# Patient Record
Sex: MALE | Race: WHITE | NOT HISPANIC OR LATINO | URBAN - METROPOLITAN AREA
[De-identification: names, ages, dates, MRNs, and addresses within clinical notes are randomized per-mention and may not be internally consistent; named-entity substitution may affect disease eponyms.]

---

## 2021-04-09 NOTE — IMPRESSION/PLAN
Impression: Primary open-angle glaucoma, severe stage, bilateral Plan: PT HAS POAG OU     GONIO : SS OU (05/08/17)     PACHS: 572 OU (04/11/17) THE LEFT EYE IS THE BETTER SEEING EYE DUE TO BILATERAL SEVERE VF LOSS OU ((WAS OFF TREATMENT 8 YRS PRIOR TO 4/11/17) S/P SLT OD 01/22/2021  SLT OS 02/05/2021 S/P RD OS IN 2012 PT DENIES SULFA ALLERGY
PT DENIES LUNG DZ
TARGET IOP LOW TEENS OR LESS OU
RECOMMEND 1. CONTINUE LATANOPROST OU QPM (STARTED 4/11/17) 2. CONTINUE TIMOLOL 1/2  OU BID 3. CONTINUE BRIMONIDINE OU TID 4. S/P SLT OU AND THE  PT IS DOING WELL 5.  F/U 6 MONTHS

## 2021-04-26 NOTE — IMPRESSION/PLAN
Impression: Exudative macular degeneration, with active choroidal neovascularization, bilateral Plan: Active CNVM OU - minimal SRF OD and persistent retinal fluid OS, continue t7xewqpt Eylea OS, continue w8axzuzf Eylea OD. Eylea 1/3 OU given today without complication. R/B/A discussed at length.  Review in 4 weeks for Eylea 2/3 OS

## 2021-05-24 NOTE — IMPRESSION/PLAN
Impression: Exudative macular degeneration, with active choroidal neovascularization, bilateral Plan: Active CNVM OU - minimal SRF OD and persistent retinal fluid OS, continue c4rhmwim Eylea OS, continue a6zcqnqe Eylea OD. Eylea 2/3 OS given today without complication. R/B/A discussed at length.  Review in 4 weeks for Eylea 3/3 OU

## 2021-06-21 NOTE — IMPRESSION/PLAN
Impression: Exudative macular degeneration, with active choroidal neovascularization, bilateral Plan: Active CNVM OU - minimal SRF OD and persistent retinal fluid OS, continue m7snlrsr Eylea OS, continue a7ecrdug Eylea OD. Eylea 3/3 OU given today without complication. R/B/A discussed at length.  Review in 4 weeks for a dilated exam, likely Eylea just OS

## 2021-07-19 NOTE — IMPRESSION/PLAN
Impression: Exudative macular degeneration, with active choroidal neovascularization, bilateral Plan: Active CNVM OU - minimal SRF OD and persistent retinal fluid OS, continue x2qnvoep Eylea OS, continue a4yvdcsr Eylea OD. Eylea 1/3 OS given today without complication. R/B/A discussed at length.  Review in 4 weeks for Eylea 2/3 OU

## 2021-08-24 NOTE — IMPRESSION/PLAN
Impression: Exudative macular degeneration, with active choroidal neovascularization, bilateral Plan: Active CNVM OU - minimal SRF OD and persistent retinal fluid OS, continue s6xqebrw Eylea OS, continue y3nkbaym Eylea OD. Eylea 2/3 OU given today without complication. R/B/A discussed at length.  Review in 4 weeks for Eylea 3/3 OS

## 2021-09-21 NOTE — IMPRESSION/PLAN
Impression: Exudative macular degeneration, with active choroidal neovascularization, bilateral Plan: Active CNVM OU - minimal SRF OD and persistent retinal fluid OS, continue i4gofvqt Eylea OS, continue w5idurhb Eylea OD. Eylea 3/6 OS given today without complication. R/B/A discussed at length.  Review in 4 weeks for [facility] 4/6 OU

## 2021-10-19 NOTE — IMPRESSION/PLAN
Impression: Exudative macular degeneration, with active choroidal neovascularization, bilateral Plan: Active CNVM OU - minimal SRF OD and persistent retinal fluid OS, continue a2nezfer Eylea OS, continue c5xmglch Eylea OD. Eylea 4/6 OU given today without complication. R/B/A discussed at length.  Review in 4 weeks for Eylea 5/6 OS

## 2021-11-16 NOTE — IMPRESSION/PLAN
Impression: Exudative macular degeneration, with active choroidal neovascularization, bilateral Plan: Active CNVM OU - minimal SRF OD and persistent retinal fluid OS, continue y8oguyaa Eylea OS, continue l0uvjerl Eylea OD. Eylea 5/6 OS given today without complication. R/B/A discussed at length.  Review in 4 weeks for Eylea 6/6 OU

## 2021-12-01 NOTE — IMPRESSION/PLAN
Impression: Primary open-angle glaucoma, severe stage, bilateral

GONIO : SS OU (05/08/17)     PACHS: 572 OU (04/11/17) S/P SLT OD 01/22/2021  SLT OS 02/05/2021 S/P RD OS IN 2012 PT DENIES SULFA ALLERGY
PT DENIES LUNG DZ
TARGET IOP LOW TEENS OR LESS OU Plan: Great IOP OU. Recheck 6 months with RNFL

THE LEFT EYE IS THE BETTER SEEING EYE DUE TO BILATERAL SEVERE VF LOSS OU ((WAS OFF TREATMENT 8 YRS PRIOR TO 4/11/17) RECOMMEND 1. CONTINUE LATANOPROST OU QPM (STARTED 4/11/17) 2. CONTINUE TIMOLOL QAM  OU BID 3. CONTINUE BRIMONIDINE OU TID 4.  S/P SLT OU AND THE  PT IS DOING WELL

## 2021-12-01 NOTE — IMPRESSION/PLAN
Impression: Exudative macular degeneration, with active choroidal neovascularization, bilateral Plan: continue care with Dr. Martin Orourke as scheduled.

## 2021-12-14 NOTE — IMPRESSION/PLAN
Impression: Exudative macular degeneration, with active choroidal neovascularization, bilateral Plan: Active CNVM OU - minimal SRF OD and persistent retinal fluid OS, continue z5vwwtgg Eylea OS, continue f9ixccgt Eylea OD. Eylea 6/6 OU given today without complication. R/B/A discussed at length.  Review in 4 weeks for a dilated exam, possible FA/ICG (to consider extension to 6 weeks)

## 2022-01-11 NOTE — IMPRESSION/PLAN
Impression: Exudative macular degeneration, with active choroidal neovascularization, bilateral Plan: Active CNVM OU - minimal SRF OD and minimal IRF OS - extend to 6 weeks OU Eylea 1/6 OU given today without complication. R/B/A discussed at length.  Review in 6 weeks for Eylea 2/6 OU

## 2022-02-22 NOTE — IMPRESSION/PLAN
Impression: Exudative macular degeneration, with active choroidal neovascularization, bilateral Plan: Active CNVM OU - minimal SRF OD and minimal IRF OS - extend to 6 weeks OU Eylea 2/6 OU given today without complication. R/B/A discussed at length.  Review in 6 weeks for Eylea 3/6 OU

## 2022-04-05 NOTE — IMPRESSION/PLAN
Impression: Exudative macular degeneration, with active choroidal neovascularization, bilateral Plan: Active CNVM OU - minimal SRF OD and minimal IRF OS - now worse OS with 6 week extend. Decrease interval to o7ywsevi Eylea OS and continue t0wzrvca Eylea OD Eylea 3/6 OU given today without complication. R/B/A discussed at length.  Review in 4 weeks for Eylea 4/6 OS

## 2022-05-03 NOTE — IMPRESSION/PLAN
Impression: Exudative macular degeneration, with active choroidal neovascularization, bilateral Plan: Active CNVM OU - minimal SRF OD and minimal IRF OS - now worse OS with 6 week extend. Decrease interval to b9iztiwk Eylea OS and continue i8maeafq Eylea OD Switch to Vabysmo OS. Eylea 4/6 OS given today without complication. R/B/A discussed at length.  Review in 4 weeks for Eylea 5/6 OD and Vabysmo 1/3 OS

## 2022-05-31 NOTE — IMPRESSION/PLAN
Impression: Exudative macular degeneration, with active choroidal neovascularization, bilateral Plan: Active CNVM OU - minimal SRF OD and minimal IRF OS - now worse OS with 6 week extend. Switch to k2igmrmj Vabysmo OS and continue q1zuxqtu Eylea OD Eylea 5/6 OS and Vabysmo 1/3 OS given today without complication. R/B/A discussed at length.  Review in 4 weeks for Vabysmo 2/3 OS

## 2022-06-01 NOTE — IMPRESSION/PLAN
Impression: Primary open-angle glaucoma, severe stage, bilateral
2017 notes: CD OD). 95 OS). 95, Tmax OD)56 OS)58 (untreated) PACHS: 662 OU (04/11/17) S/P SLT OD 01/22/2021  SLT OS 02/05/2021 S/P RD OS IN 2012; s/p PPVIT OU
PT DENIES SULFA ALLERGY and LUNG DZ
TARGET IOP LOW TEENS OR LESS OU Plan: Large ONH appearance with possible pallor OU; ongoing injections with Dr. Juliane Tai - recent injection OS 5/31 (yesterday) IOPs today: 14/18 on Timolol, Brimonidine and Latanoprost
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 IOP suboptimal OS and HVF shows complete depression. Discussed the need to change drop regimen OS to prevent further glaucomatous damage. Informed patient that injections can also cause elevated IOP (had injection yesterday). START Dorzolamide/Timolol BID OS only (ERx'd to pharmacy) CONTINUE Timolol BID OD, Brimonidine TID OU and Latanoprost QHS OU

RTC in 4-6 weeks for IOP check

## 2022-06-01 NOTE — IMPRESSION/PLAN
Impression: Tear film insufficiency of bilateral lacrimal glands: H04.123. Plan: Discussed long term use of glaucoma drops and injections can add to dryness. Recommend AT's at least 2+ times per day or more OU. Patient instructed to wait 5 minutes in between drops.

## 2022-06-28 NOTE — IMPRESSION/PLAN
Impression: Exudative macular degeneration, with active choroidal neovascularization, bilateral Plan: Active CNVM OU - minimal SRF OD and minimal IRF OS - now worse OS with 6 week extend. Switch to s7ywiuty Vabysmo OS and continue t5vkqnyf Eylea OD Vabysmo 2/3 OS given today without complication. R/B/A discussed at length.  Review in 4 weeks for Eylea OD and Vabysmo 3/3 OS

## 2022-07-15 NOTE — IMPRESSION/PLAN
Impression: Primary open-angle glaucoma, severe stage, bilateral
2017 notes: CD OD). 95 OS). 95, Tmax OD)56 OS)58 (untreated) PACHS: 098 OU (04/11/17) S/P SLT OD 01/22/2021  SLT OS 02/05/2021 S/P RD OS IN 2012; s/p PPVIT OU
PT DENIES SULFA ALLERGY and LUNG DZ
TARGET IOP LOW TEENS OR LESS OU
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 Cm Terry - recent injection OS 6/28 IOPs today: /11 Dorzolamide/Timolol, Brimonidine and Latanoprost 
Reviewed today's findings with patient. Good reduction of IOP after starting Dorzolamide/Timolol OS. IOPs likely elevated following retinal injections. Will continue current drop regimen and closely monitor to prevent further glaucomatous changes. 
Continue Dorzolamide/Timolol BID OS only, Timolol BID OD, Brimonidine TID OU and Latanoprost QHS OU

RTC in 3-4 months for CE (DO NOT HAVE TO DILATE OS) + RNFL/GCA OCT

## 2022-07-26 NOTE — IMPRESSION/PLAN
Impression: Exudative macular degeneration, with active choroidal neovascularization, bilateral Plan: Active CNVM OU - minimal SRF OD and minimal IRF OS - now worse OS with 6 week extend. Switch to p2pyjvuk Vabysmo OS and continue t5ywrqlm Eylea OD Vabysmo 3/3 OS and Eylea OD given today without complication. R/B/A discussed at length.  Review in 4 weeks for dilated exam, possible FA/ICG

## 2022-08-23 NOTE — IMPRESSION/PLAN
Impression: Exudative macular degeneration, with active choroidal neovascularization, bilateral Plan: Active CNVM OU - SRF resolved OU, extend to t1nmtptg Eylea OD and e4kdjrjh Vabysmo OS Vabysmo 1/3 OS and Eylea OD administered today without complication. R/B/A discussed at length. Review in 6 weeks for Vabysmo 2/3 OS with Eylea OD.

## 2022-10-04 NOTE — IMPRESSION/PLAN
Impression: Exudative macular degeneration, with active choroidal neovascularization, bilateral Plan: Active CNVM OU - SRF resolved OU, extend to i9jpkqvj Eylea OD and n6xtatgk Vabysmo OS Vabysmo 2/3 OS and Eylea OD administered today without complication. R/B/A discussed at length. Review in 6 weeks for Vabysmo 3/3 OS with Eylea OD.

## 2022-11-09 ENCOUNTER — OFFICE VISIT (OUTPATIENT)
Dept: URBAN - METROPOLITAN AREA CLINIC 51 | Facility: CLINIC | Age: 67
End: 2022-11-09
Payer: MEDICARE

## 2022-11-09 DIAGNOSIS — H01.022 SQUAMOUS BLEPHARITIS OF RIGHT LOWER LID: ICD-10-CM

## 2022-11-09 DIAGNOSIS — H01.021 SQUAMOUS BLEPHARITIS OF RIGHT UPPER LID: ICD-10-CM

## 2022-11-09 DIAGNOSIS — H01.025 SQUAMOUS BLEPHARITIS OF LEFT LOWER LID: ICD-10-CM

## 2022-11-09 DIAGNOSIS — H01.024 SQUAMOUS BLEPHARITIS OF LEFT UPPER LID: ICD-10-CM

## 2022-11-09 DIAGNOSIS — H35.3231 EXUDATIVE AGE-RELATED MACULAR DEGENERATION, BILATERAL, WITH ACTIVE CHOROIDAL NEOVASCULARIZATION: ICD-10-CM

## 2022-11-09 DIAGNOSIS — H04.123 TEAR FILM INSUFFICIENCY OF BILATERAL LACRIMAL GLANDS: ICD-10-CM

## 2022-11-09 DIAGNOSIS — H43.313 VITREOUS MEMBRANES AND STRANDS, BILATERAL: ICD-10-CM

## 2022-11-09 DIAGNOSIS — H40.1133 PRIMARY OPEN-ANGLE GLAUCOMA, BILATERAL, SEVERE STAGE: Primary | ICD-10-CM

## 2022-11-09 PROCEDURE — 92133 CPTRZD OPH DX IMG PST SGM ON: CPT | Performed by: OPTOMETRIST

## 2022-11-09 PROCEDURE — 99214 OFFICE O/P EST MOD 30 MIN: CPT | Performed by: OPTOMETRIST

## 2022-11-09 PROCEDURE — 92134 CPTRZ OPH DX IMG PST SGM RTA: CPT | Performed by: OPTOMETRIST

## 2022-11-09 ASSESSMENT — INTRAOCULAR PRESSURE
OS: 16
OD: 14

## 2022-11-09 NOTE — IMPRESSION/PLAN
Impression: Tear film insufficiency of bilateral lacrimal glands: H04.123. Plan: Discussed long term use of glaucoma drops and injections can add to dryness. Has been out of AT and injection/nelly increased. Recommend AT's at least 2+ times per day or more OU.

## 2022-11-09 NOTE — IMPRESSION/PLAN
Impression: Squamous blepharitis of right upper lid: H01.021. Plan: Educated patient on condition and findings. Accounts for complaints. Recommend START daily lid scrubs Theratears sterilid cleanser (spray vs. foam). Wrote down name for patient.

## 2022-11-09 NOTE — IMPRESSION/PLAN
Impression: Primary open-angle glaucoma, severe stage, bilateral
2017 notes: CD OD). 95 OS). 95, Tmax OD)56 OS)58 (untreated) PACHS: 537 OU (04/11/17) S/P SLT OD 01/22/2021  SLT OS 02/05/2021 S/P RD OS IN 2012; s/p PPVIT OU
PT DENIES SULFA ALLERGY and LUNG DZ
TARGET IOP LOW TEENS OR LESS OU
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 Perez Range IOPs today: 14/16 on Latanoprost, Timolol, Brimonidine, and Dorzolamide/Timolol RNFL OCT (11/09/2022) OD: abn sup and inf  ;  OS: WNL
GCA OCT (11/09/2022) OD: WNL;  OS: abn Reviewed today's findings with patient. IOPs elevated today OS>OD. Discussed long term use of glaucoma drops will add to dryness. Recommend referring to glaucoma for further mangmen / possible procedure, patient prefers. Will continue current drop regimen and closely monitor to prevent further glaucomatous changes. Continue Dorzolamide/Timolol BID OS only, Timolol BID OD, Brimonidine TID OU and Latanoprost QHS OU Refer to glaucoma for further evaluation (Dr. Sheba Vegas)

## 2022-11-09 NOTE — IMPRESSION/PLAN
Impression: Exudative macular degeneration, with active choroidal neovascularization, bilateral Plan: Stable appearance. MAC OCT taken today. Will continue to limit BCVA. Continue AREDS 2 vitamins. Patient to contact our office if notices further changes, worsening or distortion in vision. Follow up as scheduled with Dr. Yogi Ocampo for further evaluation.

## 2022-11-15 ENCOUNTER — OFFICE VISIT (OUTPATIENT)
Dept: URBAN - METROPOLITAN AREA CLINIC 51 | Facility: CLINIC | Age: 67
End: 2022-11-15
Payer: MEDICARE

## 2022-11-15 DIAGNOSIS — H35.3231 EXUDATIVE AGE-RELATED MACULAR DEGENERATION, BILATERAL, WITH ACTIVE CHOROIDAL NEOVASCULARIZATION: Primary | ICD-10-CM

## 2022-11-15 PROCEDURE — 92134 CPTRZ OPH DX IMG PST SGM RTA: CPT | Performed by: OPHTHALMOLOGY

## 2022-11-15 ASSESSMENT — INTRAOCULAR PRESSURE
OS: 12
OD: 14

## 2022-11-15 NOTE — IMPRESSION/PLAN
Impression: Exudative macular degeneration, with active choroidal neovascularization, bilateral Plan: Active CNVM OU - SRF resolved OU, extend to d1mnbnuj Eylea OD and m5bhvchb Vabysmo OS Vabysmo 3/3 OS and Eylea OD administered today without complication. R/B/A discussed at length.  Review in 6 weeks for exam, possible FA/ICG

## 2023-03-07 NOTE — IMPRESSION/PLAN
Impression: Exudative macular degeneration, with active choroidal neovascularization, bilateral Plan: Active CNVM OU - SRF resolved OU, extend to w8frmkpf Eylea OD and i4tiqkke Vabysmo OS Vabysmo 2/3 OS and Eylea OD administered today without complication. R/B/A discussed at length.  Review in 8 weeks for Vabysmo 3/3 OS and Eylea OD

## 2023-05-02 NOTE — IMPRESSION/PLAN
Impression: Exudative macular degeneration, with active choroidal neovascularization, bilateral Plan: Active CNVM OU - SRF resolved OU, extend to u8edtrrz Eylea OD and h0ghexlm Vabysmo OS Vabysmo 3/3 OS and Eylea OD administered today without complication. R/B/A discussed at length.  Review in 8 weeks for a dilated exam, possible FA/ICG

## 2023-07-11 NOTE — IMPRESSION/PLAN
Impression: Exudative macular degeneration, with active choroidal neovascularization, bilateral Plan: Active CNVM OU -  minimal fluid recurred, continue a5kkyohb Eylea OD and b8mjblbu Vabysmo OS Vabysmo 1/3 OS and Eylea OD administered today without complication. R/B/A discussed at length.  Review in 8 weeks for Eylea OD and Vabysmo 2/3 OS